# Patient Record
Sex: MALE | Race: WHITE
[De-identification: names, ages, dates, MRNs, and addresses within clinical notes are randomized per-mention and may not be internally consistent; named-entity substitution may affect disease eponyms.]

---

## 2018-02-28 ENCOUNTER — HOSPITAL ENCOUNTER (OUTPATIENT)
Dept: HOSPITAL 45 - C.RDSM | Age: 63
Discharge: HOME | End: 2018-02-28
Attending: ORTHOPAEDIC SURGERY
Payer: OTHER GOVERNMENT

## 2018-02-28 DIAGNOSIS — M25.612: Primary | ICD-10-CM

## 2018-02-28 DIAGNOSIS — M54.9: ICD-10-CM

## 2018-03-09 ENCOUNTER — HOSPITAL ENCOUNTER (OUTPATIENT)
Dept: HOSPITAL 45 - C.MRI | Age: 63
Discharge: HOME | End: 2018-03-09
Attending: ORTHOPAEDIC SURGERY
Payer: OTHER GOVERNMENT

## 2018-03-09 DIAGNOSIS — M47.22: Primary | ICD-10-CM

## 2018-03-09 NOTE — DIAGNOSTIC IMAGING REPORT
MRI OF THE CERVICAL SPINE WITHOUT IV CONTRAST



CLINICAL HISTORY: Left upper extremity radiculopathy. Neck pain.



COMPARISON STUDY: Radiographs of the cervical spine dated 2/28/2018.



TECHNIQUE: MRI of the cervical spine is performed utilizing various T1 and

T2-weighted sequences in the axial and sagittal planes. IV contrast was not

administered for this examination. The examination is modestly degraded by

motion artifact.



FINDINGS:



Cervical spine: Marrow signal intensity is heterogeneous. Vertebral body height

and alignment are maintained throughout the cervical spine. The atlantodental

articulation appears maintained. The spinous processes are intact. Chronic

degenerative endplate change with mild endplate edema is seen at C7-T1. Mild

degenerative endplate edema is seen at C6-C7. Tiny anterior osteophytes are seen

in the lower cervical region.



Intervertebral discs: There is degenerative disc desiccation and loss of height

seen throughout the cervical spine. Loss of height is moderate at C6-C7 and

C7-T1.



Spinal cord: The cervical spinal cord is normal in morphology and signal

intensity.



C2-C3: Unremarkable.



C3-C4: A posterior disc osteophyte complex minimally effaces the ventral

subarachnoid space. The neural foramina appear clear.



C4-C5: A posterior disc osteophyte complex eccentric to the left abuts the

ventral cord. Uncovertebral and facet arthropathy causes moderate to severe left

and moderate right neural foraminal stenosis.



C5-C6: A posterior disc osteophyte complex eccentric to the right abuts the

ventral cord. Uncovertebral and facet arthropathy cause mild left neural

foraminal stenosis.



C6-C7: A posterior disc osteophyte complex eccentric to the right minimally

effaces the right aspect of the ventral cord. In conjunction with uncovertebral

arthropathy this causes moderate to severe right neural foraminal stenosis.

Uncovertebral and facet arthropathy cause minimal left neural foraminal

stenosis.



C7-T1: Predominantly uncovertebral arthropathy causes mild left-sided neural

foraminal stenosis.



Soft tissues: The prevertebral and paraspinous soft tissues are normal in

appearance.



Brain parenchyma: Partially visualized brain parenchyma at the skull base is

within normal limits.





IMPRESSION:



1. Multilevel cervical spondylosis as above. See discussion for detailed level

by level analysis.



2. The cervical spinal cord is normal in morphology and signal intensity.









Dictated:  3/9/2018 1:35 PM

Transcribed:  3/9/2018 2:10 PM

Samuel







Electronically signed by:  Mehdi Goins M.D.

3/9/2018 2:15 PM



Dictated Date/Time:  3/9/2018 1:35 PM

## 2018-03-29 ENCOUNTER — HOSPITAL ENCOUNTER (EMERGENCY)
Dept: HOSPITAL 45 - C.EDB | Age: 63
Discharge: HOME | End: 2018-03-29
Payer: OTHER GOVERNMENT

## 2018-03-29 VITALS
HEIGHT: 70 IN | HEIGHT: 70 IN | WEIGHT: 205.03 LBS | WEIGHT: 205.03 LBS | BODY MASS INDEX: 29.35 KG/M2 | BODY MASS INDEX: 29.35 KG/M2

## 2018-03-29 VITALS — OXYGEN SATURATION: 98 % | SYSTOLIC BLOOD PRESSURE: 120 MMHG | DIASTOLIC BLOOD PRESSURE: 74 MMHG | HEART RATE: 75 BPM

## 2018-03-29 VITALS — TEMPERATURE: 98.06 F

## 2018-03-29 DIAGNOSIS — M54.12: Primary | ICD-10-CM

## 2018-03-29 NOTE — EMERGENCY ROOM VISIT NOTE
ED Visit Note


First contact with patient:  17:47


CHIEF COMPLAINT: Neck pain radiating down the left arm








HISTORY OF PRESENT ILLNESS: This 62-year-old male patient presents to the 

emergency department, ambulatory, with his wife, complaining of pain in the 

neck which is increased and is now radiating down his left arm after completing 

physical therapy this evening.  The patient is currently being followed by 

orthopedics, and states he has been going to physical therapy.  Today, the 

physical therapist was performing a chin tuck maneuver and pushing on the 

nerves in his back.  He states this maneuver seemed to worsen the pain, and 

states he is noticing a tingling sensation and three quarters of his upper arm.

  He states he did have a tingling sensation and was unable to feel his fingers 

initially.  He is now able to feel his fingers and has good sensation, but 

states he is experiencing a tingling sensation in the fifth digit.  He was 

unable to contact his orthopedic surgeon due to the time of day, but does have 

plans to contact them tomorrow.  The patient did take 2 Aleve at approximately 8

:00 this morning.  He has taken no medications since then.  The incident 

occurred at approximately 510 this evening.  The physical therapist did 

recommend he come to the emergency department for evaluation.  The patient 

rates the pain as sharp and 5/10.  The patient does have a known history of a 

"ruptured disc at C5 which is pressing on the nerves "which occurred 3 months 

ago after a fall on the ice. The patient denies chest pain or shortness of 

breath.  There was no head injury and no loss of consciousness.  The patient 

denies headache, blurred vision, abdominal pain, nausea, or vomiting.  The 

patient denies change in personality.  








REVIEW OF SYSTEMS: A 10 system review of systems was completed with positives 

and pertinent negatives listed in the HPI. 








ALLERGIES: None








MEDICATIONS: Aleve








PMH: Chronic neck pain, cholecystectomy








SOCIAL HISTORY: The patient lives locally with family.  He denies drug, alcohol

, tobacco use.








PHYSICAL EXAM: 


VITALS: Vitals are noted on the nurse's note and reviewed by myself.  Vital 

signs stable.  GENERAL: This is a 62-year-old white male, in no acute distress, 

nondiaphoretic, well-developed well-nourished.  SKIN: Capillary reflex less 

than 2 seconds.  HEENT: Normocephalic.  PERRLA.  EOMI.  Nares patent.  Mucous 

membranes moist.  Neck is supple without nuchal rigidity.  Cervical spine is 

mildly tender to palpation in the area of C5 through C7.  The patient does have 

tenderness of the paraspinal muscles on the left.  Palpation of the paraspinous 

muscles on the left does elicit worsening paresthesias of the left arm.  There 

is no lymphadenopathy.  MUSCULOSKELETAL: The patient has full range of motion 

of the bilateral arms.  Strength 5/5 of the bilateral upper extremities.  The 

patient has tenderness with rotation left lateral bending of the neck.   NEURO: 

Patient was alert and oriented to person place and time.  Normal sensation to 

light and sharp touch.  No focal neurologic deficits.





RADIOLOGY:


CERVICAL SPINE 2 OR 3 VIEWS





CLINICAL HISTORY: 62 years-old Male presenting with neck pain with right upper


extremity paresthesias. 





TECHNIQUE: Lateral, open-mouth odontoid, and frontal views of the cervical spine


were obtained. 





COMPARISON: MR from 3/9/2018. 





FINDINGS:


Normal cervical lordosis. Vertebral bodies maintain normal height and alignment.


Disc osteophyte complexes noted at C4-5 and C6-7 and C7-T1. No significant


posterior bony spurring is evident. No compression deformity or acute


subluxation. Lateral masses of C1 articulate normally with C2. Normal predental


interval. No prevertebral soft tissue swelling.





IMPRESSION:


1.  No radiographic evidence of acute osseous injury of the cervical spine.





2.  Multilevel degenerative changes.











Electronically signed by:  Radhames Pineda M.D.


3/29/2018 7:12 PM





Dictated Date/Time:  3/29/2018 7:11 PM





EMERGENCY DEPARTMENT COURSE: I examined the patient.  His symptoms are 

consistent with a cervical radiculopathy.  I suspect this is associated with 

his physical therapy and exercises he was performing today.  The patient was 

given 30 mg Toradol IM and noted significant improvement in his pain.  He will 

be given a short course of muscle relaxers to help with ongoing spasms.  The 

patient's wife states they do have plans to contact the orthopedic surgeon 

first thing tomorrow morning.  The patient is agreeable with discharge at this 

time.  All questions were answered to the patient and his wife's satisfaction.  

Discharge instructions reviewed, the patient was discharged home in good 

condition.





I attest that I have personally reviewed the patient's current medication list. 


Blood Pressure Screening: Patient was found to have a slightly elevated blood 

pressure due to circumstances. I do not believe that the patient requires 

hypertension monitoring. 





Etiologies such as soft tissue injury, fracture, dislocation, neurovascular 

compromise, compartment syndrome, as well as others were entertained.  








DIAGNOSIS: Cervicalgia with radiculopathy








The chart was completed utilizing Dragon Speech voice recognition software. 

Grammatical errors, random word insertions, pronoun errors, and incomplete 

sentences are an occasional consequence of this system due to software 

limitations, ambient noise, and hardware issues. Any formal questions or 

concerns about the content, text, or information contained within the body of 

this dictation should be directly addressed to the provider for clarification.


Current/Historical Medications


Scheduled


Naproxen (Aleve), 440 MG PO UD





Allergies


Coded Allergies:  


     No Known Allergies (Unverified , 8/7/17)


Uncoded Allergies:  


     NONE (Allergy, Unknown, 11/23/03)





Vital Signs











  Date Time  Temp Pulse Resp B/P (MAP) Pulse Ox O2 Delivery O2 Flow Rate FiO2


 


3/29/18 19:46  75 16 120/74 98   


 


3/29/18 17:37 36.7 65 20 178/90 99 Room Air  











Medications Administered











 Medications


  (Trade)  Dose


 Ordered  Sig/Shruthi


 Route  Start Time


 Stop Time Status Last Admin


Dose Admin


 


 Ketorolac


 Tromethamine


  (Toradol Inj)  30 mg  NOW  STAT


 IM  3/29/18 18:13


 3/29/18 18:15 DC 3/29/18 18:44


30 MG


 


 Cyclobenzaprine


 HCl


  (FLEXERIL 10MG


 Home Pack)  1 homepack  UD  STAT


 PO  3/29/18 19:22


 3/29/18 19:23 DC 3/29/18 19:22


1 HOMEPACK











Departure Information


Impression





 Primary Impression:  


 Cervical radicular pain





Dispostion


Home / Self-Care





Condition


GOOD





Forms


HOME CARE DOCUMENTATION FORM,                                                 

               IMPORTANT VISIT INFORMATION





Patient Instructions


ED Neck Back Pain General, [facility]





Additional Instructions





You have been treated in the Emergency Department for Neck Pain. You have 

received pain medicine in the emergency department which impairs your ability 

to operate a vehicle. It is illegal for you to drive after receiving these 

medicines. 





You have been prescribed Flexeril (cyclobenzaprine) 1 tabs orally, three times 

per day. Do NOT exceed 30 mg (3 tabs) per day. Take your first dose at bedtime 

as it can make you drowsy. Always take all medications as prescribed.





For pain control, you can use the following over-the-counter medicines (if >13 yo):


Ibuprofen(Motrin, Advil) may be used for fever or pain.  Use 600mg every six 

hours as needed.  Take with food.  Avoid using more than 2400mg in a 24 hour 

period.  Do not use 2400mg per day for more than three consecutive days without 

physician direction.  Prolonged inappropriate use can lead to stomach upset or 

ulcers. You may take naproxen (Aleve) 1-2 tablets twice daily in place of 

ibuprofen.


(AND/OR)


Acetaminophen(Tylenol) may be used for fever or pain.  Use 1000mg every six 

hours as needed.  Avoid using more than 3000mg in a 24 hour period.  





If this is an acute injury, ice can be applied to the area of pain for the 

first 3 days to help decrease pain and inflammation. After the first 3 days, a 

heating pad can be used over the area for continued soothing relief.





You should schedule a follow-up appointment in 2-3 days with your Primary Care 

Provider/orthopedic surgeon for further evaluation and treatment of your neck 

pain.





Return to the Emergency Department if your current symptoms worsen despite 

treatment course outlined above, or if you develop any of the following symptoms

: intractable pain despite aforementioned treatment course, facial droop, 

slurred speech, unilateral weakness, or worsening of her current symptoms.

## 2018-03-29 NOTE — DIAGNOSTIC IMAGING REPORT
CERVICAL SPINE 2 OR 3 VIEWS



CLINICAL HISTORY: 62 years-old Male presenting with neck pain with right upper

extremity paresthesias. 



TECHNIQUE: Lateral, open-mouth odontoid, and frontal views of the cervical spine

were obtained. 



COMPARISON: MR from 3/9/2018. 



FINDINGS:

Normal cervical lordosis. Vertebral bodies maintain normal height and alignment.

Disc osteophyte complexes noted at C4-5 and C6-7 and C7-T1. No significant

posterior bony spurring is evident. No compression deformity or acute

subluxation. Lateral masses of C1 articulate normally with C2. Normal predental

interval. No prevertebral soft tissue swelling.



IMPRESSION:

1.  No radiographic evidence of acute osseous injury of the cervical spine.



2.  Multilevel degenerative changes.







Electronically signed by:  Radhames Pineda M.D.

3/29/2018 7:12 PM



Dictated Date/Time:  3/29/2018 7:11 PM